# Patient Record
(demographics unavailable — no encounter records)

---

## 2024-11-04 NOTE — PHYSICAL EXAM
[Normal] : no respiratory distress, lungs were clear to auscultation bilaterally and no accessory muscle use [Normal Rate] : normal [Rhythm Regular] : regular [Normal S1] : normal S1 [Normal S2] : normal S2 [I] : a grade 1 [de-identified] : right pelvis no pain w palpation [de-identified] : dry skin. right elbow dry skin that has been excoriated. it is healing [de-identified] : tremor bl  hands and right leg  [de-identified] : anxious

## 2024-11-04 NOTE — HISTORY OF PRESENT ILLNESS
[FreeTextEntry1] : patient presents for follow-up and discuss about medications and how to take them.  [de-identified] : 87 yo wf hx HLD, hypothyroid, parkinsons, anxiety  here for  follow up on thyroid. she only started the venlafaxine on . the pharmacy didnt tell her it was ready. she wants to know how long she takes it for and does she take the lexapro all by itself.     10/2/24  she was constipated and then took senokot and then had a clear discharge she made appt with Dr Jarrell.  she has a pain in the right pelvis she is wondering if b12 is safe with carbiodopa levodopa she is on venlafaxine for years and she is depressed. she is wondering what can be done. it does not seem to be working  24 she is still tired. she still naps. she is on the alternating dose and she finds it confusing.  she would rather just take one dose rather than alternate.  she is on the propranolol for bp and tremors. she is tired. could it be from this? can i take b12 and carbidopa levodopa.?   24 she is tired. she does her morning routine and then at 1 pm goes to watch tv and falls asleep on the couch  for 1 hr  she wakes up does more things and fall asleep fine. she is on levothyroxine 75 alt 50 mcg or is it a side effect of carbidopa levodopa. ?    24 since her   her son is very difficult person to live with and is very anxious. no cp sob leg swelling . she is on propranolol for tremors.   24 she is alternating 50 mcg and 75 mcg ./she used to be on velafaxine 300 mg but ins would only cover 150 mg.  she is not compliant with b12.  she is depressed her sister is very sick  her  passed her son is a very anxious and negative person the ins will only cover 1 venlafaxine  10/30/23 she is concerned with her b12 level. and she has parkinsons. she is on carbidopa levodopa from the neuro.  right leg popped blood vessel. it is sore swollen and tender  I have anxiety and i use the xanax prn. I have so much anxiety. My sister had a stroke

## 2024-11-04 NOTE — ASSESSMENT
[FreeTextEntry1] :   depression. wean venlafaxine to 75 mg qd 30 Nov 1- dec 1. then  take lexapro 10 mg po qd all by itself     hypothyroid.   levothyroxine 50 mcg qd .  follow up [facility] for labs and b12 shot.

## 2024-11-04 NOTE — REVIEW OF SYSTEMS
[Fatigue] : fatigue [Recent Change In Weight] : ~T recent weight change [Memory Loss] : memory loss [Anxiety] : anxiety [Depression] : depression [Negative] : Respiratory [de-identified] : tremors extremities and voice

## 2025-01-06 NOTE — HEALTH RISK ASSESSMENT
[No] : In the past 12 months have you used drugs other than those required for medical reasons? No [No falls in past year] : Patient reported no falls in the past year [Little interest or pleasure doing things] : 1) Little interest or pleasure doing things [Feeling down, depressed, or hopeless] : 2) Feeling down, depressed, or hopeless [0] : 2) Feeling down, depressed, or hopeless: Not at all (0) [PHQ-2 Negative - No further assessment needed] : PHQ-2 Negative - No further assessment needed [Audit-CScore] : 0 [[id number]] : 0 [Never] : Never

## 2025-01-06 NOTE — ASSESSMENT
[FreeTextEntry1] : depression anxiety  she has been on  lexapro 10 mg po qd all by itself  since dec 2 she is still anxious. she takes 1/2 xanax here and there.  increase lexapro to 20 mg qd    hypothyroid.   levothyroxine 50 mcg qd .  follow up [facility] for labs

## 2025-01-06 NOTE — PHYSICAL EXAM
[Normal] : no respiratory distress, lungs were clear to auscultation bilaterally and no accessory muscle use [Normal Rate] : normal [Rhythm Regular] : regular [Normal S1] : normal S1 [Normal S2] : normal S2 [I] : a grade 1 [de-identified] : right pelvis no pain w palpation [de-identified] : dry skin. right elbow dry skin that has been excoriated. it is healing [de-identified] : tremor bl  hands and right leg  [de-identified] : anxious

## 2025-01-06 NOTE — REVIEW OF SYSTEMS
[Fatigue] : fatigue [Recent Change In Weight] : ~T recent weight change [Memory Loss] : memory loss [Anxiety] : anxiety [Depression] : depression [Negative] : Respiratory [Insomnia] : no insomnia [de-identified] : tremors extremities and voice

## 2025-01-06 NOTE — HISTORY OF PRESENT ILLNESS
[FreeTextEntry1] : patient presents for follow-up  [de-identified] : 85 yo wf hx HLD, hypothyroid, parkinsons, anxiety  here for  follow up on thyroid. she is only on the lexapro no more venlafaxine.  i am anxious still.. i have old xanax i break in half every once in a while in the afternoon i go to watch tv and i fall asleep. alli causes me anxiety i worry what is going to happen [id number]  she only started the venlafaxine on . the pharmacy didnt tell her it was ready. she wants to know how long she takes it for and does she take the lexapro all by itself.     10/2/24  she was constipated and then took senokot and then had a clear discharge she made appt with Dr Jarrell.  she has a pain in the right pelvis she is wondering if b12 is safe with carbiodopa levodopa she is on venlafaxine for years and she is depressed. she is wondering what can be done. it does not seem to be working  24 she is still tired. she still naps. she is on the alternating dose and she finds it confusing.  she would rather just take one dose rather than alternate.  she is on the propranolol for bp and tremors. she is tired. could it be from this? can i take b12 and carbidopa levodopa.?   24 she is tired. she does her morning routine and then at 1 pm goes to watch tv and falls asleep on the couch  for 1 hr  she wakes up does more things and fall asleep fine. she is on levothyroxine 75 alt 50 mcg or is it a side effect of carbidopa levodopa. ?    24 since her   her son is very difficult person to live with and is very anxious. no cp sob leg swelling . she is on propranolol for tremors.   24 she is alternating 50 mcg and 75 mcg ./she used to be on velafaxine 300 mg but ins would only cover 150 mg.  she is not compliant with b12.  she is depressed her sister is very sick  her  passed her son is a very anxious and negative person the ins will only cover 1 venlafaxine  10/30/23 she is concerned with her b12 level. and she has parkinsons. she is on carbidopa levodopa from the [facility].  right leg popped blood vessel. it is sore swollen and tender  I have anxiety and i use the xanax prn. I have so much anxiety. My sister had a stroke

## 2025-03-10 NOTE — HEALTH RISK ASSESSMENT
[No] : In the past 12 months have you used drugs other than those required for medical reasons? No [No falls in past year] : Patient reported no falls in the past year [Little interest or pleasure doing things] : 1) Little interest or pleasure doing things [Feeling down, depressed, or hopeless] : 2) Feeling down, depressed, or hopeless [0] : 2) Feeling down, depressed, or hopeless: Not at all (0) [PHQ-2 Negative - No further assessment needed] : PHQ-2 Negative - No further assessment needed [Never] : Never [Audit-CScore] : 0 [[id number]] : 0

## 2025-03-10 NOTE — PHYSICAL EXAM
[Normal] : no respiratory distress, lungs were clear to auscultation bilaterally and no accessory muscle use [Normal Rate] : normal [Rhythm Regular] : regular [Normal S1] : normal S1 [Normal S2] : normal S2 [I] : a grade 1 [de-identified] : right pelvis no pain w palpation [de-identified] : dry skin. right elbow dry skin that has been excoriated. it is healing [de-identified] : tremor bl  hands and right leg  [de-identified] : anxious

## 2025-03-10 NOTE — ASSESSMENT
[FreeTextEntry1] : depression anxiety  she has been on  lexapro 20 mg po qd   she is still anxious.   increase lexapro to 20 mg qd 10 mg qd = 30 mg qd     hypothyroid. Labs drawn/ specimens obtained  in office on this date of service  for evaluation of   assessed conditions -  cbc cmp tsh   to be run at [facility].   eczema cont creams as directed

## 2025-03-10 NOTE — REVIEW OF SYSTEMS
[Fatigue] : fatigue [Recent Change In Weight] : ~T recent weight change [Memory Loss] : memory loss [Anxiety] : anxiety [Depression] : depression [Negative] : Respiratory [Insomnia] : no insomnia [de-identified] : tremors extremities and voice

## 2025-03-10 NOTE — HISTORY OF PRESENT ILLNESS
[FreeTextEntry1] : patient presents for follow-up  [de-identified] : 85 yo wf hx HLD, hypothyroid, parkinsons, anxiety  here for  follow up on thyroid.  I have eczema i saw derm. I use dove soap dont use washcloth . she told me to use serave.   I am on lexapro I still feel nervous and shaky. In the afternoon and I can fall asleep. I need something for anxiety.  25 she is only on the lexapro no more venlafaxine.  i am anxious still.. i have old xanax i break in half every once in a while in the afternoon i go to watch tv and i fall asleep. alli causes me anxiety i worry what is going to happen   she only started the venlafaxine on . the pharmacy didnt tell her it was ready. she wants to know how long she takes it for and does she take the lexapro all by itself.     10/2/24  she was constipated and then took senokot and then had a clear discharge she made appt with Dr Jarrell.  she has a pain in the right pelvis she is wondering if b12 is safe with carbiodopa levodopa she is on venlafaxine for years and she is depressed. she is wondering what can be done. it does not seem to be working  24 she is still tired. she still naps. she is on the alternating dose and she finds it confusing.  she would rather just take one dose rather than alternate.  she is on the propranolol for bp and tremors. she is tired. could it be from this? can i take b12 and carbidopa levodopa.?   24 she is tired. she does her morning routine and then at 1 pm goes to watch tv and falls asleep on the couch  for 1 hr  she wakes up does more things and fall asleep fine. she is on levothyroxine 75 alt 50 mcg or is it a side effect of carbidopa levodopa. ?    24 since her   her son is very difficult person to live with and is very anxious. no cp sob leg swelling . she is on propranolol for tremors.   24 she is alternating 50 mcg and 75 mcg ./she used to be on velafaxine 300 mg but ins would only cover 150 mg.  she is not compliant with b12.  she is depressed her sister is very sick  her  passed her son is a very anxious and negative person the ins will only cover 1 venlafaxine  10/30/23 she is concerned with her b12 level. and she has parkinsons. she is on carbidopa levodopa from the neuro.  right leg popped blood vessel. it is sore swollen and tender  I have anxiety and i use the xanax prn. I have so much anxiety. My sister had a stroke

## 2025-06-11 NOTE — COUNSELING
[Fall prevention counseling provided] : Fall prevention counseling provided [Adequate lighting] : Adequate lighting [No throw rugs] : No throw rugs [Use proper foot wear] : Use proper foot wear [Use recommended devices] : Use recommended devices [FreeTextEntry1] : cane [None] : None [Good understanding] : Patient has a good understanding of lifestyle changes and steps needed to achieve self management goal

## 2025-06-11 NOTE — REVIEW OF SYSTEMS
[Fatigue] : fatigue [Recent Change In Weight] : ~T recent weight change [Memory Loss] : memory loss [Anxiety] : anxiety [Depression] : depression [Negative] : Respiratory [Insomnia] : no insomnia [de-identified] : tremors extremities and voice

## 2025-06-11 NOTE — HISTORY OF PRESENT ILLNESS
[FreeTextEntry1] : patient presents for follow-up  [de-identified] : 88 yo wf hx HLD, hypothyroid, parkinsons, anxiety  here for  follow up on thyroid. what dose am i supposed to be on for lexapro the pharmacy gives me the 10 mg . and the 20 mg  i get tired . could it be the medicine. I read all the side effects and it can be fatigue and dizziness I am still constipated. i take senokot my eczema is better-  I saw derm. she says it is not cancer. she told me to use certain creams. cetafil and eucerin   I would like a xanax renewal. I take them very sparingly i break them in half  3/10/25  I have eczema i saw derm. I use dove soap dont use washcloth . she told me to use serave.   I am on lexapro I still feel nervous and shaky. In the afternoon and I can fall asleep. I need something for anxiety.  25 she is only on the lexapro no more venlafaxine.  i am anxious still.. i have old xanax i break in half every once in a while in the afternoon i go to watch tv and i fall asleep. alli causes me anxiety i worry what is going to happen [id number]  she only started the venlafaxine on . the pharmacy didnt tell her it was ready. she wants to know how long she takes it for and does she take the lexapro all by itself.     10/2/24  she was constipated and then took senokot and then had a clear discharge she made appt with Dr Jarrell.  she has a pain in the right pelvis she is wondering if b12 is safe with carbiodopa levodopa she is on venlafaxine for years and she is depressed. she is wondering what can be done. it does not seem to be working  24 she is still tired. she still naps. she is on the alternating dose and she finds it confusing.  she would rather just take one dose rather than alternate.  she is on the propranolol for bp and tremors. she is tired. could it be from this? can i take b12 and carbidopa levodopa.?   24 she is tired. she does her morning routine and then at 1 pm goes to watch tv and falls asleep on the couch  for 1 hr  she wakes up does more things and fall asleep fine. she is on levothyroxine 75 alt 50 mcg or is it a side effect of carbidopa levodopa. ?    24 since her   her son is very difficult person to live with and is very anxious. no cp sob leg swelling . she is on propranolol for tremors.   24 she is alternating 50 mcg and 75 mcg ./she used to be on velafaxine 300 mg but ins would only cover 150 mg.  she is not compliant with b12.  she is depressed her sister is very sick  her  passed her son is a very anxious and negative person the ins will only cover 1 venlafaxine  10/30/23 she is concerned with her b12 level. and she has parkinsons. she is on carbidopa levodopa from the [facility].  right leg popped blood vessel. it is sore swollen and tender  I have anxiety and i use the xanax prn. I have so much anxiety. My sister had a stroke

## 2025-06-11 NOTE — HEALTH RISK ASSESSMENT
[No] : In the past 12 months have you used drugs other than those required for medical reasons? No [No falls in past year] : Patient reported no falls in the past year [Little interest or pleasure doing things] : 1) Little interest or pleasure doing things [Feeling down, depressed, or hopeless] : 2) Feeling down, depressed, or hopeless [0] : 2) Feeling down, depressed, or hopeless: Not at all (0) [PHQ-2 Negative - No further assessment needed] : PHQ-2 Negative - No further assessment needed [Never] : Never [[id number]] : 0 [Audit-CScore] : 0

## 2025-06-11 NOTE — PHYSICAL EXAM
[Normal] : no respiratory distress, lungs were clear to auscultation bilaterally and no accessory muscle use [Normal Rate] : normal [Rhythm Regular] : regular [Normal S1] : normal S1 [Normal S2] : normal S2 [I] : a grade 1 [de-identified] : right pelvis no pain w palpation [de-identified] : dry skin. right elbow dry skin that has been excoriated. it is healing [de-identified] : tremor bl  hands and right leg  [de-identified] : anxious

## 2025-06-11 NOTE — ASSESSMENT
[FreeTextEntry1] : depression anxiety  she has been on  lexapro 20 mg po qd   she is still anxious.   increase lexapro to 20 mg qd 10 mg qd = 30 mg qd   renew xanax. .25 mg prn anxiety 30   As per usual protocol the patient was advised in regard to the risks of driving when on medications with side effects of dizziness or drowsiness. Patient has been assessed for increased risk for respiratory depression. Patient denies suicidal ideations or plan.  Istop checked.  hypothyroid. cont levothyroxine 50 mcg qd 90 Labs drawn/ specimens obtained  in office on this date of service  for evaluation of   assessed conditions -  cbc cmp tsh   to be run at [facility].   eczema cont creams as directed  HLD  Basic cardiovascular prevention measures are advised including regular exercise, surveillance medical examination, and prudent portion-controlled low fat diet, rich in a variety of vegetables with minimal added sugars, refined starches, and no artificially hydrogenated oils. Discussion and interpretation of previous tests , external notes( labs, radiology, specialist  , patient verbalized understanding. Prescription drug management continue pravastatin 40 mg qd

## 2025-07-30 NOTE — HISTORY OF PRESENT ILLNESS
[FreeTextEntry1] : patient presents for follow-up  [de-identified] : 86 yo wf hx HLD, hypothyroid, parkinsons, anxiety  here for  follow up on thyroid. she is very tired she has eczema 25 what dose am i supposed to be on for lexapro the pharmacy gives me the 10 mg . and the 20 mg  i get tired . could it be the medicine. I read all the side effects and it can be fatigue and dizziness I am still constipated. i take senokot my eczema is better-  I saw derm. she says it is not cancer. she told me to use certain creams. cetafil and eucerin   I would like a xanax renewal. I take them very sparingly i break them in half  3/10/25  I have eczema i saw derm. I use dove soap dont use washcloth . she told me to use serave.   I am on lexapro I still feel nervous and shaky. In the afternoon and I can fall asleep. I need something for anxiety.  25 she is only on the lexapro no more venlafaxine.  i am anxious still.. i have old xanax i break in half every once in a while in the afternoon i go to watch tv and i fall asleep. alli causes me anxiety i worry what is going to happen   she only started the venlafaxine on . the pharmacy didnt tell her it was ready. she wants to know how long she takes it for and does she take the lexapro all by itself.     10/2/24  she was constipated and then took senokot and then had a clear discharge she made appt with Dr Jarrell.  she has a pain in the right pelvis she is wondering if b12 is safe with carbiodopa levodopa she is on venlafaxine for years and she is depressed. she is wondering what can be done. it does not seem to be working  24 she is still tired. she still naps. she is on the alternating dose and she finds it confusing.  she would rather just take one dose rather than alternate.  she is on the propranolol for bp and tremors. she is tired. could it be from this? can i take b12 and carbidopa levodopa.?   24 she is tired. she does her morning routine and then at 1 pm goes to watch tv and falls asleep on the couch  for 1 hr  she wakes up does more things and fall asleep fine. she is on levothyroxine 75 alt 50 mcg or is it a side effect of carbidopa levodopa. ?    24 since her   her son is very difficult person to live with and is very anxious. no cp sob leg swelling . she is on propranolol for tremors.   24 she is alternating 50 mcg and 75 mcg ./she used to be on velafaxine 300 mg but ins would only cover 150 mg.  she is not compliant with b12.  she is depressed her sister is very sick  her  passed her son is a very anxious and negative person the ins will only cover 1 venlafaxine  10/30/23 she is concerned with her b12 level. and she has parkinsons. she is on carbidopa levodopa from the [facility].  right leg popped blood vessel. it is sore swollen and tender  I have anxiety and i use the xanax prn. I have so much anxiety. My sister had a stroke

## 2025-07-30 NOTE — COUNSELING
[Fall prevention counseling provided] : Fall prevention counseling provided [Adequate lighting] : Adequate lighting [No throw rugs] : No throw rugs [Use proper foot wear] : Use proper foot wear [Use recommended devices] : Use recommended devices [None] : None [Good understanding] : Patient has a good understanding of lifestyle changes and steps needed to achieve self management goal [FreeTextEntry1] : cane

## 2025-07-30 NOTE — PHYSICAL EXAM
[Normal] : no respiratory distress, lungs were clear to auscultation bilaterally and no accessory muscle use [Normal Rate] : normal [Rhythm Regular] : regular [Normal S1] : normal S1 [Normal S2] : normal S2 [I] : a grade 1 [de-identified] : right pelvis no pain w palpation [de-identified] : dry skin. right elbow dry skin that has been excoriated. it is healing [de-identified] : tremor bl  hands and right leg  [de-identified] : anxious

## 2025-07-30 NOTE — ASSESSMENT
[FreeTextEntry1] : depression anxiety     lexapro 20 mg po qd     increase lexapro to 20 mg qd 10 mg qd = 30 mg qd   renew xanax. .25 mg prn anxiety 30   As per usual protocol the patient was advised in regard to the risks of driving when on medications with side effects of dizziness or drowsiness. Patient has been assessed for increased risk for respiratory depression. Patient denies suicidal ideations or plan.  Istop checked.  hypothyroid. cont levothyroxine 50 mcg qd 90 Labs drawn/ specimens obtained  in office on this date of service  for evaluation of   assessed conditions -  cbc cmp tsh   to be run at [facility].   eczema trial betamethasone cream to areas hyponatremia check sodium  HLD  Basic cardiovascular prevention measures are advised including regular exercise, surveillance medical examination, and prudent portion-controlled low fat diet, rich in a variety of vegetables with minimal added sugars, refined starches, and no artificially hydrogenated oils. Discussion and interpretation of previous tests , external notes( labs, radiology, specialist  , patient verbalized understanding. Prescription drug management continue pravastatin 40 mg qd

## 2025-07-30 NOTE — HEALTH RISK ASSESSMENT
[No] : In the past 12 months have you used drugs other than those required for medical reasons? No [No falls in past year] : Patient reported no falls in the past year [Little interest or pleasure doing things] : 1) Little interest or pleasure doing things [Feeling down, depressed, or hopeless] : 2) Feeling down, depressed, or hopeless [0] : 2) Feeling down, depressed, or hopeless: Not at all (0) [PHQ-2 Negative - No further assessment needed] : PHQ-2 Negative - No further assessment needed [Never] : Never [Audit-CScore] : 0 [[id number]] : 0

## 2025-07-30 NOTE — REVIEW OF SYSTEMS
[Fatigue] : fatigue [Memory Loss] : memory loss [Anxiety] : anxiety [Depression] : depression [Negative] : Respiratory [Recent Change In Weight] : ~T no recent weight change [Nasal Discharge] : nasal discharge [Postnasal Drip] : no postnasal drip [Itching] : Itching [Skin Rash] : skin rash [Insomnia] : no insomnia [de-identified] : right elbow [de-identified] : tremors extremities and voice